# Patient Record
Sex: FEMALE | Race: WHITE | Employment: FULL TIME | URBAN - METROPOLITAN AREA
[De-identification: names, ages, dates, MRNs, and addresses within clinical notes are randomized per-mention and may not be internally consistent; named-entity substitution may affect disease eponyms.]

---

## 2017-06-28 ENCOUNTER — HOSPITAL ENCOUNTER (OUTPATIENT)
Dept: PREADMISSION TESTING | Age: 57
Discharge: HOME | End: 2017-06-28
Payer: COMMERCIAL

## 2017-06-28 VITALS
DIASTOLIC BLOOD PRESSURE: 80 MMHG | WEIGHT: 210 LBS | SYSTOLIC BLOOD PRESSURE: 124 MMHG | TEMPERATURE: 98.1 F | HEART RATE: 80 BPM | RESPIRATION RATE: 18 BRPM | BODY MASS INDEX: 38.64 KG/M2 | HEIGHT: 62 IN

## 2017-06-28 LAB
ANION GAP BLD CALC-SCNC: 9 MMOL/L (ref 5–15)
BASOPHILS # BLD AUTO: 0 K/UL (ref 0–0.1)
BASOPHILS # BLD: 0 % (ref 0–1)
BUN SERPL-MCNC: 12 MG/DL (ref 6–20)
BUN/CREAT SERPL: 14 (ref 12–20)
CALCIUM SERPL-MCNC: 8.8 MG/DL (ref 8.5–10.1)
CHLORIDE SERPL-SCNC: 106 MMOL/L (ref 97–108)
CO2 SERPL-SCNC: 25 MMOL/L (ref 21–32)
CREAT SERPL-MCNC: 0.86 MG/DL (ref 0.55–1.02)
EOSINOPHIL # BLD: 0.2 K/UL (ref 0–0.4)
EOSINOPHIL NFR BLD: 4 % (ref 0–7)
ERYTHROCYTE [DISTWIDTH] IN BLOOD BY AUTOMATED COUNT: 13.5 % (ref 11.5–14.5)
GLUCOSE SERPL-MCNC: 90 MG/DL (ref 65–100)
HCT VFR BLD AUTO: 38.9 % (ref 35–47)
HGB BLD-MCNC: 13 G/DL (ref 11.5–16)
LYMPHOCYTES # BLD AUTO: 25 % (ref 12–49)
LYMPHOCYTES # BLD: 1.6 K/UL (ref 0.8–3.5)
MCH RBC QN AUTO: 29.6 PG (ref 26–34)
MCHC RBC AUTO-ENTMCNC: 33.4 G/DL (ref 30–36.5)
MCV RBC AUTO: 88.6 FL (ref 80–99)
MONOCYTES # BLD: 0.4 K/UL (ref 0–1)
MONOCYTES NFR BLD AUTO: 6 % (ref 5–13)
NEUTS SEG # BLD: 4.2 K/UL (ref 1.8–8)
NEUTS SEG NFR BLD AUTO: 65 % (ref 32–75)
PLATELET # BLD AUTO: 238 K/UL (ref 150–400)
POTASSIUM SERPL-SCNC: 4.1 MMOL/L (ref 3.5–5.1)
RBC # BLD AUTO: 4.39 M/UL (ref 3.8–5.2)
SODIUM SERPL-SCNC: 140 MMOL/L (ref 136–145)
WBC # BLD AUTO: 6.3 K/UL (ref 3.6–11)

## 2017-06-28 PROCEDURE — 80048 BASIC METABOLIC PNL TOTAL CA: CPT | Performed by: PLASTIC SURGERY

## 2017-06-28 PROCEDURE — 36415 COLL VENOUS BLD VENIPUNCTURE: CPT | Performed by: PLASTIC SURGERY

## 2017-06-28 PROCEDURE — 85025 COMPLETE CBC W/AUTO DIFF WBC: CPT | Performed by: PLASTIC SURGERY

## 2017-06-28 RX ORDER — DIPHENHYDRAMINE HCL 25 MG
25 TABLET ORAL
COMMUNITY

## 2017-06-28 RX ORDER — ZOLPIDEM TARTRATE 10 MG/1
10 TABLET ORAL
COMMUNITY

## 2017-06-28 RX ORDER — LEVOTHYROXINE SODIUM 125 UG/1
100 TABLET ORAL
COMMUNITY

## 2017-07-07 ENCOUNTER — ANESTHESIA EVENT (OUTPATIENT)
Dept: MEDSURG UNIT | Age: 57
End: 2017-07-07
Payer: COMMERCIAL

## 2017-07-10 ENCOUNTER — ANESTHESIA (OUTPATIENT)
Dept: MEDSURG UNIT | Age: 57
End: 2017-07-10
Payer: COMMERCIAL

## 2017-07-10 ENCOUNTER — HOSPITAL ENCOUNTER (OUTPATIENT)
Age: 57
Setting detail: OUTPATIENT SURGERY
Discharge: HOME | End: 2017-07-10
Attending: PLASTIC SURGERY | Admitting: PLASTIC SURGERY
Payer: COMMERCIAL

## 2017-07-10 VITALS
DIASTOLIC BLOOD PRESSURE: 65 MMHG | BODY MASS INDEX: 38.62 KG/M2 | RESPIRATION RATE: 21 BRPM | HEIGHT: 62 IN | SYSTOLIC BLOOD PRESSURE: 126 MMHG | TEMPERATURE: 97.8 F | WEIGHT: 209.88 LBS | OXYGEN SATURATION: 96 % | HEART RATE: 105 BPM

## 2017-07-10 PROCEDURE — 74011000250 HC RX REV CODE- 250

## 2017-07-10 PROCEDURE — 77030032490 HC SLV COMPR SCD KNE COVD -B: Performed by: PLASTIC SURGERY

## 2017-07-10 PROCEDURE — 77030011266 HC ELECTRD BLD INSL COVD -A: Performed by: PLASTIC SURGERY

## 2017-07-10 PROCEDURE — 74011250636 HC RX REV CODE- 250/636: Performed by: ANESTHESIOLOGY

## 2017-07-10 PROCEDURE — 76210000032 HC AMBSU PH I REC 3 TO 3.5 HR: Performed by: PLASTIC SURGERY

## 2017-07-10 PROCEDURE — 77030002966 HC SUT PDS J&J -A: Performed by: PLASTIC SURGERY

## 2017-07-10 PROCEDURE — 77030013567 HC DRN WND RESERV BARD -A: Performed by: PLASTIC SURGERY

## 2017-07-10 PROCEDURE — 77030020782 HC GWN BAIR PAWS FLX 3M -B

## 2017-07-10 PROCEDURE — 76030000008 HC AMB SURG OR TIME 4 TO 4.5: Performed by: PLASTIC SURGERY

## 2017-07-10 PROCEDURE — 77030026227 HC FUNL KELLR 2 PCH ALGN -C: Performed by: PLASTIC SURGERY

## 2017-07-10 PROCEDURE — 74011250636 HC RX REV CODE- 250/636

## 2017-07-10 PROCEDURE — 88305 TISSUE EXAM BY PATHOLOGIST: CPT | Performed by: PLASTIC SURGERY

## 2017-07-10 PROCEDURE — 77030018836 HC SOL IRR NACL ICUM -A: Performed by: PLASTIC SURGERY

## 2017-07-10 PROCEDURE — 77030002933 HC SUT MCRYL J&J -A: Performed by: PLASTIC SURGERY

## 2017-07-10 PROCEDURE — 77030013674 HC FIL EXPND TISS ALGN -A: Performed by: PLASTIC SURGERY

## 2017-07-10 PROCEDURE — 77030011267 HC ELECTRD BLD COVD -A: Performed by: PLASTIC SURGERY

## 2017-07-10 PROCEDURE — 77030012407 HC DRN WND BARD -B: Performed by: PLASTIC SURGERY

## 2017-07-10 PROCEDURE — 74011000258 HC RX REV CODE- 258: Performed by: PLASTIC SURGERY

## 2017-07-10 PROCEDURE — 74011000272 HC RX REV CODE- 272: Performed by: PLASTIC SURGERY

## 2017-07-10 PROCEDURE — 74011000250 HC RX REV CODE- 250: Performed by: PLASTIC SURGERY

## 2017-07-10 PROCEDURE — 74011250637 HC RX REV CODE- 250/637: Performed by: PLASTIC SURGERY

## 2017-07-10 PROCEDURE — 77030008684 HC TU ET CUF COVD -B: Performed by: ANESTHESIOLOGY

## 2017-07-10 PROCEDURE — 77030019702 HC WRP THER MENM -C: Performed by: PLASTIC SURGERY

## 2017-07-10 PROCEDURE — 76060000068 HC AMB SURG ANES 4 TO 4.5 HR: Performed by: PLASTIC SURGERY

## 2017-07-10 PROCEDURE — 77030026438 HC STYL ET INTUB CARD -A: Performed by: ANESTHESIOLOGY

## 2017-07-10 PROCEDURE — 77030011640 HC PAD GRND REM COVD -A: Performed by: PLASTIC SURGERY

## 2017-07-10 RX ORDER — NEOSTIGMINE METHYLSULFATE 1 MG/ML
INJECTION INTRAVENOUS AS NEEDED
Status: DISCONTINUED | OUTPATIENT
Start: 2017-07-10 | End: 2017-07-10 | Stop reason: HOSPADM

## 2017-07-10 RX ORDER — CEPHALEXIN 250 MG/1
500 CAPSULE ORAL 3 TIMES DAILY
Qty: 42 CAP | Refills: 0 | Status: SHIPPED | OUTPATIENT
Start: 2017-07-10 | End: 2017-07-17

## 2017-07-10 RX ORDER — SODIUM CHLORIDE, SODIUM LACTATE, POTASSIUM CHLORIDE, CALCIUM CHLORIDE 600; 310; 30; 20 MG/100ML; MG/100ML; MG/100ML; MG/100ML
INJECTION, SOLUTION INTRAVENOUS
Status: DISCONTINUED | OUTPATIENT
Start: 2017-07-10 | End: 2017-07-10 | Stop reason: HOSPADM

## 2017-07-10 RX ORDER — CEFAZOLIN SODIUM IN 0.9 % NACL 2 G/100 ML
PLASTIC BAG, INJECTION (ML) INTRAVENOUS AS NEEDED
Status: DISCONTINUED | OUTPATIENT
Start: 2017-07-10 | End: 2017-07-10 | Stop reason: HOSPADM

## 2017-07-10 RX ORDER — ONDANSETRON 2 MG/ML
INJECTION INTRAMUSCULAR; INTRAVENOUS AS NEEDED
Status: DISCONTINUED | OUTPATIENT
Start: 2017-07-10 | End: 2017-07-10 | Stop reason: HOSPADM

## 2017-07-10 RX ORDER — MIDAZOLAM HYDROCHLORIDE 1 MG/ML
INJECTION, SOLUTION INTRAMUSCULAR; INTRAVENOUS AS NEEDED
Status: DISCONTINUED | OUTPATIENT
Start: 2017-07-10 | End: 2017-07-10 | Stop reason: HOSPADM

## 2017-07-10 RX ORDER — HYDROMORPHONE HYDROCHLORIDE 2 MG/ML
INJECTION, SOLUTION INTRAMUSCULAR; INTRAVENOUS; SUBCUTANEOUS AS NEEDED
Status: DISCONTINUED | OUTPATIENT
Start: 2017-07-10 | End: 2017-07-10 | Stop reason: HOSPADM

## 2017-07-10 RX ORDER — SODIUM CHLORIDE 0.9 % (FLUSH) 0.9 %
5-10 SYRINGE (ML) INJECTION AS NEEDED
Status: DISCONTINUED | OUTPATIENT
Start: 2017-07-10 | End: 2017-07-10 | Stop reason: HOSPADM

## 2017-07-10 RX ORDER — HYDROMORPHONE HYDROCHLORIDE 1 MG/ML
0.2 INJECTION, SOLUTION INTRAMUSCULAR; INTRAVENOUS; SUBCUTANEOUS
Status: DISCONTINUED | OUTPATIENT
Start: 2017-07-10 | End: 2017-07-10 | Stop reason: HOSPADM

## 2017-07-10 RX ORDER — LIDOCAINE HYDROCHLORIDE 10 MG/ML
0.1 INJECTION, SOLUTION EPIDURAL; INFILTRATION; INTRACAUDAL; PERINEURAL AS NEEDED
Status: DISCONTINUED | OUTPATIENT
Start: 2017-07-10 | End: 2017-07-10 | Stop reason: HOSPADM

## 2017-07-10 RX ORDER — MORPHINE SULFATE 10 MG/ML
2 INJECTION, SOLUTION INTRAMUSCULAR; INTRAVENOUS
Status: DISCONTINUED | OUTPATIENT
Start: 2017-07-10 | End: 2017-07-10 | Stop reason: HOSPADM

## 2017-07-10 RX ORDER — BACITRACIN ZINC 500 UNIT/G
OINTMENT (GRAM) TOPICAL AS NEEDED
Status: DISCONTINUED | OUTPATIENT
Start: 2017-07-10 | End: 2017-07-10 | Stop reason: HOSPADM

## 2017-07-10 RX ORDER — SODIUM CHLORIDE 0.9 % (FLUSH) 0.9 %
5-10 SYRINGE (ML) INJECTION EVERY 8 HOURS
Status: DISCONTINUED | OUTPATIENT
Start: 2017-07-10 | End: 2017-07-10 | Stop reason: HOSPADM

## 2017-07-10 RX ORDER — FENTANYL CITRATE 50 UG/ML
25 INJECTION, SOLUTION INTRAMUSCULAR; INTRAVENOUS
Status: DISCONTINUED | OUTPATIENT
Start: 2017-07-10 | End: 2017-07-10 | Stop reason: HOSPADM

## 2017-07-10 RX ORDER — PROPOFOL 10 MG/ML
INJECTION, EMULSION INTRAVENOUS AS NEEDED
Status: DISCONTINUED | OUTPATIENT
Start: 2017-07-10 | End: 2017-07-10 | Stop reason: HOSPADM

## 2017-07-10 RX ORDER — FENTANYL CITRATE 50 UG/ML
INJECTION, SOLUTION INTRAMUSCULAR; INTRAVENOUS AS NEEDED
Status: DISCONTINUED | OUTPATIENT
Start: 2017-07-10 | End: 2017-07-10 | Stop reason: HOSPADM

## 2017-07-10 RX ORDER — DEXAMETHASONE SODIUM PHOSPHATE 4 MG/ML
INJECTION, SOLUTION INTRA-ARTICULAR; INTRALESIONAL; INTRAMUSCULAR; INTRAVENOUS; SOFT TISSUE AS NEEDED
Status: DISCONTINUED | OUTPATIENT
Start: 2017-07-10 | End: 2017-07-10 | Stop reason: HOSPADM

## 2017-07-10 RX ORDER — SUCCINYLCHOLINE CHLORIDE 20 MG/ML
INJECTION INTRAMUSCULAR; INTRAVENOUS AS NEEDED
Status: DISCONTINUED | OUTPATIENT
Start: 2017-07-10 | End: 2017-07-10 | Stop reason: HOSPADM

## 2017-07-10 RX ORDER — OXYCODONE AND ACETAMINOPHEN 5; 325 MG/1; MG/1
1-2 TABLET ORAL
Qty: 40 TAB | Refills: 0 | Status: ON HOLD | OUTPATIENT
Start: 2017-07-10 | End: 2017-09-05

## 2017-07-10 RX ORDER — ROCURONIUM BROMIDE 10 MG/ML
INJECTION, SOLUTION INTRAVENOUS AS NEEDED
Status: DISCONTINUED | OUTPATIENT
Start: 2017-07-10 | End: 2017-07-10 | Stop reason: HOSPADM

## 2017-07-10 RX ORDER — SODIUM CHLORIDE, SODIUM LACTATE, POTASSIUM CHLORIDE, CALCIUM CHLORIDE 600; 310; 30; 20 MG/100ML; MG/100ML; MG/100ML; MG/100ML
50 INJECTION, SOLUTION INTRAVENOUS CONTINUOUS
Status: DISCONTINUED | OUTPATIENT
Start: 2017-07-10 | End: 2017-07-10 | Stop reason: HOSPADM

## 2017-07-10 RX ORDER — SCOLOPAMINE TRANSDERMAL SYSTEM 1 MG/1
1.5 PATCH, EXTENDED RELEASE TRANSDERMAL ONCE
Status: DISCONTINUED | OUTPATIENT
Start: 2017-07-10 | End: 2017-07-10 | Stop reason: HOSPADM

## 2017-07-10 RX ORDER — ONDANSETRON 2 MG/ML
4 INJECTION INTRAMUSCULAR; INTRAVENOUS AS NEEDED
Status: DISCONTINUED | OUTPATIENT
Start: 2017-07-10 | End: 2017-07-10 | Stop reason: HOSPADM

## 2017-07-10 RX ORDER — GLYCOPYRROLATE 0.2 MG/ML
INJECTION INTRAMUSCULAR; INTRAVENOUS AS NEEDED
Status: DISCONTINUED | OUTPATIENT
Start: 2017-07-10 | End: 2017-07-10 | Stop reason: HOSPADM

## 2017-07-10 RX ORDER — LIDOCAINE HYDROCHLORIDE 20 MG/ML
INJECTION, SOLUTION EPIDURAL; INFILTRATION; INTRACAUDAL; PERINEURAL AS NEEDED
Status: DISCONTINUED | OUTPATIENT
Start: 2017-07-10 | End: 2017-07-10 | Stop reason: HOSPADM

## 2017-07-10 RX ADMIN — ROCURONIUM BROMIDE 10 MG: 10 INJECTION, SOLUTION INTRAVENOUS at 12:21

## 2017-07-10 RX ADMIN — NEOSTIGMINE METHYLSULFATE 3 MG: 1 INJECTION INTRAVENOUS at 14:09

## 2017-07-10 RX ADMIN — FENTANYL CITRATE 25 MCG: 50 INJECTION, SOLUTION INTRAMUSCULAR; INTRAVENOUS at 12:01

## 2017-07-10 RX ADMIN — Medication 2 G: at 10:45

## 2017-07-10 RX ADMIN — HYDROMORPHONE HYDROCHLORIDE 1 MG: 2 INJECTION, SOLUTION INTRAMUSCULAR; INTRAVENOUS; SUBCUTANEOUS at 11:39

## 2017-07-10 RX ADMIN — SODIUM CHLORIDE, SODIUM LACTATE, POTASSIUM CHLORIDE, CALCIUM CHLORIDE: 600; 310; 30; 20 INJECTION, SOLUTION INTRAVENOUS at 14:09

## 2017-07-10 RX ADMIN — SODIUM CHLORIDE, SODIUM LACTATE, POTASSIUM CHLORIDE, CALCIUM CHLORIDE: 600; 310; 30; 20 INJECTION, SOLUTION INTRAVENOUS at 10:35

## 2017-07-10 RX ADMIN — ONDANSETRON 4 MG: 2 INJECTION INTRAMUSCULAR; INTRAVENOUS at 12:50

## 2017-07-10 RX ADMIN — PROPOFOL 200 MG: 10 INJECTION, EMULSION INTRAVENOUS at 10:41

## 2017-07-10 RX ADMIN — SUCCINYLCHOLINE CHLORIDE 140 MG: 20 INJECTION INTRAMUSCULAR; INTRAVENOUS at 10:41

## 2017-07-10 RX ADMIN — LIDOCAINE HYDROCHLORIDE 100 MG: 20 INJECTION, SOLUTION EPIDURAL; INFILTRATION; INTRACAUDAL; PERINEURAL at 10:41

## 2017-07-10 RX ADMIN — SODIUM CHLORIDE, SODIUM LACTATE, POTASSIUM CHLORIDE, AND CALCIUM CHLORIDE 50 ML/HR: 600; 310; 30; 20 INJECTION, SOLUTION INTRAVENOUS at 10:00

## 2017-07-10 RX ADMIN — FENTANYL CITRATE 150 MCG: 50 INJECTION, SOLUTION INTRAMUSCULAR; INTRAVENOUS at 10:41

## 2017-07-10 RX ADMIN — ROCURONIUM BROMIDE 35 MG: 10 INJECTION, SOLUTION INTRAVENOUS at 10:55

## 2017-07-10 RX ADMIN — ROCURONIUM BROMIDE 5 MG: 10 INJECTION, SOLUTION INTRAVENOUS at 10:41

## 2017-07-10 RX ADMIN — DEXAMETHASONE SODIUM PHOSPHATE 8 MG: 4 INJECTION, SOLUTION INTRA-ARTICULAR; INTRALESIONAL; INTRAMUSCULAR; INTRAVENOUS; SOFT TISSUE at 10:57

## 2017-07-10 RX ADMIN — MIDAZOLAM HYDROCHLORIDE 2 MG: 1 INJECTION, SOLUTION INTRAMUSCULAR; INTRAVENOUS at 10:35

## 2017-07-10 RX ADMIN — GLYCOPYRROLATE 0.5 MG: 0.2 INJECTION INTRAMUSCULAR; INTRAVENOUS at 14:09

## 2017-07-10 NOTE — ANESTHESIA PREPROCEDURE EVALUATION
Anesthetic History     Increased risk of difficult airway and PONV          Review of Systems / Medical History  Patient summary reviewed, nursing notes reviewed and pertinent labs reviewed    Pulmonary  Within defined limits                 Neuro/Psych   Within defined limits           Cardiovascular                  Exercise tolerance: >4 METS     GI/Hepatic/Renal                Endo/Other      Hypothyroidism  Arthritis and cancer    Comments:  Hx breast cancer Other Findings            Physical Exam    Airway  Mallampati: III  TM Distance: > 6 cm  Neck ROM: normal range of motion   Mouth opening: Normal    Comments: Reduced opening Cardiovascular    Rhythm: regular  Rate: normal         Dental  No notable dental hx       Pulmonary  Breath sounds clear to auscultation               Abdominal  Abdominal exam normal       Other Findings            Anesthetic Plan    ASA: 3  Anesthesia type: general          Induction: Intravenous  Anesthetic plan and risks discussed with: Patient

## 2017-07-10 NOTE — BRIEF OP NOTE
BRIEF OPERATIVE NOTE    Date of Procedure: 7/10/2017   Preoperative Diagnosis: LEFT BREAST CANCER, BREAST ASSYMETRY  Postoperative Diagnosis: LEFT BREAST CANCER, BREAST ASSYMETRY    Procedure(s):  RIGHT BREAST REDUCTION MAMMOPLASTY SYMMETRY; REVISION LEFT BREAST RECONSTRUCTION   Surgeon(s) and Role:     * Aleksandr Holden MD - Primary         Assistant Staff:       Surgical Staff:  Circ-1: Arjun Breen RN  Circ-Relief: Bell Otoole RN; Brooklyn Shepadr RN  Registered Nurse Assistant: Sreedhar Garcia RN; Eli Terry RN  Scrub RN-1: Bell Otoole RN  Scrub RN-Relief: Elissa Del Toro RN  Event Time In   Incision Start 1117   Incision Close      Anesthesia: General   Estimated Blood Loss: 50 cc  Specimens:   ID Type Source Tests Collected by Time Destination   1 : RIGHT BREAST TISSUE Fresh Breast  Aleksandr Holden MD 7/10/2017 1254 Pathology   2 : LEFT BREAST TISSUE Fresh Breast  Aleksandr Holden MD 7/10/2017 1254 Pathology      Findings: none    Complications: none    Implants: * No implants in log *

## 2017-07-10 NOTE — IP AVS SNAPSHOT
[address] 
[phone] Patient: Namrata Anderson MRN: [id number] TRO:8[id number] You are allergic to the following No active allergies Recent Documentation Height Weight BMI OB Status Smoking Status 1.575 m 95.2 kg 38.39 kg/m2 Postmenopausal Never Smoker Emergency Contacts Name Discharge Info Relation Home Work Mobile Deven Schulte DISCHARGE CAREGIVER [3] Spouse [3] [phone] About your hospitalization You were admitted on:  July 10, 2017 You last received care in the:  [facility] PAC[facility] You were discharged on:  July 10, 2017 Unit phone number:  [phone] Why you were hospitalized Your primary diagnosis was:  Not on File Providers Seen During Your Hospitalizations Provider Role Specialty Primary office phone Jazlyn Elias MD Attending Provider Plastic Surgery [phone] Your Primary Care Physician (PCP) Primary Care Physician Office Phone Office Fax Kennedy Lasara [phone] Follow-up Information Follow up With Details Comments Contact Info Lyle Buitrago MD   [address] [facility] [address] [facility] 
[phone] Jazlyn Elias MD Schedule an appointment as soon as possible for a visit in 2 days Please call [phone] to schedule [address] [facility] [address] [place] [address] 
[phone] Current Discharge Medication List  
  
START taking these medications Dose & Instructions Dispensing Information Comments Morning Noon Evening Bedtime  
 cephALEXin 250 mg capsule Commonly known as:  Yolande Pisano Your last dose was: Your next dose is:    
   
   
 Dose:  500 mg Take 2 Caps by mouth three (3) times daily for 7 days. Quantity:  42 Cap Refills:  0 oxyCODONE-acetaminophen 5-325 mg per tablet Commonly known as:  PERCOCET Your last dose was: Your next dose is:    
   
   
 Dose:  1-2 Tab Take 1-2 Tabs by mouth every four (4) hours as needed for Pain. Max Daily Amount: 12 Tabs. Quantity:  40 Tab Refills:  0 CONTINUE these medications which have NOT CHANGED Dose & Instructions Dispensing Information Comments Morning Noon Evening Bedtime AMBIEN 10 mg tablet Generic drug:  zolpidem Your last dose was: Your next dose is:    
   
   
 Dose:  10 mg Take 10 mg by mouth nightly as needed for Sleep. Refills:  0  
     
   
   
   
  
 BENADRYL ALLERGY 25 mg tablet Generic drug:  diphenhydrAMINE Your last dose was: Your next dose is:    
   
   
 Dose:  25 mg Take 25 mg by mouth nightly. Refills:  0  
     
   
   
   
  
 SYNTHROID 125 mcg tablet Generic drug:  levothyroxine Your last dose was: Your next dose is:    
   
   
 Dose:  125 mcg Take 125 mcg by mouth nightly. Refills:  0 Where to Get Your Medications Information on where to get these meds will be given to you by the nurse or doctor. ! Ask your nurse or doctor about these medications  
  cephALEXin 250 mg capsule  
 oxyCODONE-acetaminophen 5-325 mg per tablet Discharge Instructions DANIEL Kamara, Bj Hogan MD, [facility], [address] [place] [address] 
 
breast reduction post-operative instructions 1. Surgical Bra:  You will be in a surgical bra following the procedure, with bandages covering the skin glue on the incisions. You may remove the bandages 24 hours after your surgery. The fitted post-operative bra should be worn at all times except when showering for the first two weeks.   There may be a small amount of oozing from the incisions for the first several days. This is normal.  The bra should be washed when it gets soiled. 2. Drains:  If surgical drains are placed during the operation, they are usually removed the next day. If it is necessary to go home with the drains, you will receive a separate set of instructions for care of the drains. 3. Support bra:  After the first two weeks, you may wear a sports-type bra that fastens in the front and has NO UNDERWIRE. This should be worn day and night, except when showering, for one month after your date of surgery. 4. Activity:  Take it easy for the first several days. No cleaning, housework, or strenuous activity. Do not lift anything over 10 pounds, including children. You may resume non-vigorous activities at two weeks, then normal activities at four weeks. 5. Positioning:  Do not lie on your belly for two weeks. It is alright to lie on your side while sleeping. 6. Bathing: You may shower one day after the surgery. No tub baths or swimming for one week. Remove any gauze before showering. Skin glue will cover your incision. This can get wet in the shower, just letting the water run over it. Then pat everything dry. The skin glue usually falls off on its own, but you may remove it gently if it is still present after one week. Medication:  You will receive prescriptions for an antibiotic and pain medication. Take the antibiotic until it is finished, and the pain medication as needed according to the directions. Avoid aspirin and non-steroidal anti-inflammatories (e.g. ibuprofen) for two weeks after surgery. Do not take the sleep medication with the pain medication. 7. Swelling: If you experience excessive swelling on one side, out of proportion to the other side, this could be due to bleeding under the skin (hematoma). Please call the doctor immediately should this occur.  
 
8. Numbness:  Numbness or unusual sensations of the breasts are to be expected. It can take several weeks to months for this to resolve. Occasionally there may be persistent numbness. If your breasts become increasingly painful or tender, please call the office, [phone]. 9. Follow-up appointment: please call the office at [phone] during regular business hours to schedule an appointment in 2 days. CHANELLE Drain Instructions Purpose: You have had surgery during which a Jorge-Pena drain, or CHANELLE drain, has been placed by your surgeon. A CHANELLE drain is a rubber tube which goes under the skin and drains excess fluid during the healing process so that this fluid does not accumulate. There is a one-way valve which allows the fluid to collect in the bulb on the end of the drain. The drain is usually held in place by a single stitch. The color of the drainage can range from reddish to pink to straw-colored. Care of the drain:  Caring for the CHANELLE drain is fairly easy. First, protect the drain so that it does not get pulled inadvertently. You may fasten them to your clothing with a safety pin through the floppy tag on the bulb. DO NOT place a pin through either the drain tubing or the bulb itself. The drain works by maintaining a constant low pressure of suction when the bulb is in the collapsed (squeezed in) position. If the bulb will not maintain this collapsed position when it is recapped, please call the office, as the drain may not be working properly. If you had an On-Q® pain pump placed during your surgery, you may wish to keep the black param pack once the On-Q® has been removed to carry the CHANELLE drain bulbs. Measuring the drainage:  Grasp the bulb in one hand and remove the cap with the other hand. This will cause the bulb to relax into a round shape. Holding the open end over a specimen cup, squirt the fluid into the cup by squeezing the bulb.   Once the bulb is empty, squeeze it in (collapse it) with one hand, and replace the cap with your other hand. Then holding the measuring cup level, note how much fluid there is (in milliliters, mL), and record this number on the chart below. Discard the fluid in the toilet and rinse out the specimen cup. Note: your specimen cup may be in cubic centimeters (cc). 1 cc = 1 mL. Removal:  The CHANELLE drain will be removed in the office when the daily drainage is at a low enough level. You may be asked to call the office with your measuring totals to determine if the drain(s) is (are) ready to be removed. Removal involves minimal discomfort without the need for anesthesia. The drain site in the skin heals on its own once the drain is removed without any further stitches. Showering: Your surgeon may give you permission to shower while you have one or more CHANELLE drains in. Just let the water run over the drain sites and then pat them dry when you are done. Some patients like to place a long string necklace around their necks during showers to which they can safety pin the tag on the bulb to prevent it from dangling. DO NOT take a tub bath, go swimming (pool or lake/ocean), or otherwise submerge your body in water before all CHANELLE drains have been removed and you are permitted by your surgeon. Things to look out for:  Please call the office immediately ([phone]) if you notice: 
? Sudden bright or dark red bleeding into the bulb or around the drain site in the skin ? Dislodgment of the CHANLELE drain or if the drain falls out ? Spreading redness around the drain site in the skin ? Cloudy or foul-smelling drainage in the bulb or around the drain site ? Fever, shaking chills, excessive swelling, pain or discomfort ? Any other concerns or questions Date #1 (AM) #1 (PM) Daily Total #1 
(AM+PM) #2 (AM) #2 (PM) Daily Total 
#2 
(AM+PM) #3 (AM) #3 (PM) Daily Total 
#3 
(AM+PM) #4 (AM) #4 (PM) Daily Total #4 
(AM+PM) DISCHARGE SUMMARY from Nurse The following personal items are in your possession at time of discharge: 
 
Dental Appliances: None Clothing:  (clothes in bag) PATIENT INSTRUCTIONS: 
 
 
F-face looks uneven A-arms unable to move or move unevenly S-speech slurred or non-existent T-time-call 911 as soon as signs and symptoms begin-DO NOT go Back to bed or wait to see if you get better-TIME IS BRAIN. Warning Signs of HEART ATTACK Call 911 if you have these symptoms: 
? Chest discomfort. Most heart attacks involve discomfort in the center of the chest that lasts more than a few minutes, or that goes away and comes back. It can feel like uncomfortable pressure, squeezing, fullness, or pain. ? Discomfort in other areas of the upper body. Symptoms can include pain or discomfort in one or both arms, the back, neck, jaw, or stomach. ? Shortness of breath with or without chest discomfort. ? Other signs may include breaking out in a cold sweat, nausea, or lightheadedness. Don't wait more than five minutes to call [address]! Fast action can save your life. Calling 911 is almost always the fastest way to get lifesaving treatment. Emergency Medical Services staff can begin treatment when they arrive  up to an hour sooner than if someone gets to the hospital by car. The discharge information has been reviewed with the patient and spouse. The patient and spouse verbalized understanding. Discharge medications reviewed with the patient and spouse and appropriate educational materials and side effects teaching were provided. Discharge Orders None Introducing [facility]! Maris Au introduces e-Tag patient portal. Now you can access parts of your medical record, email your doctor's office, and request medication refills online. 1. In your internet browser, go to [email or web address] 2. Click on the First Time User? Click Here link in the Sign In box. You will see the New Member Sign Up page. 3. Enter your e-Tag Access Code exactly as it appears below. You will not need to use this code after youve completed the sign-up process. If you do not sign up before the expiration date, you must request a new code. · e-Tag Access Code: [email or web address] Expires: 9/26/2017 10:25 AM 
 
4. Enter the last four digits of your Social Security Number (xxxx) and Date of Birth (mm/dd/yyyy) as indicated and click Submit. You will be taken to the next sign-up page. 5. Create a e-Tag ID. This will be your e-Tag login ID and cannot be changed, so think of one that is secure and easy to remember. 6. Create a e-Tag password. You can change your password at any time. 7. Enter your Password Reset Question and Answer. This can be used at a later time if you forget your password. 8. Enter your e-mail address. You will receive e-mail notification when new information is available in [address] 9. Click Sign Up. You can now view and download portions of your medical record. 10. Click the Download Summary menu link to download a portable copy of your medical information. If you have questions, please visit the Frequently Asked Questions section of the e-Tag website. Remember, e-Tag is NOT to be used for urgent needs. For medical emergencies, dial 911. Now available from your iPhone and Android! General Information Please provide this summary of care documentation to your next provider.  
  
  
    
    
 Patient Signature: ____________________________________________________________ Date:  ____________________________________________________________  
  
[facility] Provider Signature:  ____________________________________________________________ Date:  ____________________________________________________________

## 2017-07-10 NOTE — ANESTHESIA POSTPROCEDURE EVALUATION
Post-Anesthesia Evaluation and Assessment    Patient: Denisse Jonas MRN: [id number]  SSN: [id number]    YOB: 1960  Age: 61 y.o. Sex: female       Cardiovascular Function/Vital Signs  Visit Vitals    /68    Pulse 100    Temp 36.6 °C (97.8 °F)    Resp 15    Ht 5' 2\" (1.575 m)    Wt 95.2 kg (209 lb 14.1 oz)    SpO2 98%    BMI 38.39 kg/m2       Patient is status post general anesthesia for Procedure(s):  RIGHT BREAST REDUCTION MAMMOPLASTY SYMMETRY; REVISION LEFT BREAST RECONSTRUCTION . Nausea/Vomiting: None    Postoperative hydration reviewed and adequate. Pain:  Pain Scale 1: Visual (07/10/17 1535)  Pain Intensity 1: 0 (07/10/17 1435)   Managed    Neurological Status:   Neuro (WDL): Within Defined Limits (07/10/17 1535)   At baseline    Mental Status and Level of Consciousness: Arousable    Pulmonary Status:   O2 Device: Nasal cannula (07/10/17 1435)   Adequate oxygenation and airway patent    Complications related to anesthesia: None    Post-anesthesia assessment completed.  No concerns    Signed By: Mariluz Feliciano MD     July 10, 2017

## 2017-07-10 NOTE — ROUTINE PROCESS
Patient: Sharyn Christopher MRN: [id number]  SSN: [id number]   YOB: 1960  Age: 61 y.o. Sex: female     Patient is status post Procedure(s):  RIGHT BREAST REDUCTION MAMMOPLASTY SYMMETRY; REVISION LEFT BREAST RECONSTRUCTION . Surgeon(s) and Role:     * Meenakshi Watkins MD - Primary    Local/Dose/Irrigation:  SEE MAR                  Peripheral IV 07/10/17 Right Hand (Active)   Site Assessment Clean, dry, & intact 7/10/2017 10:07 AM   Phlebitis Assessment 0 7/10/2017 10:07 AM   Infiltration Assessment 0 7/10/2017 10:07 AM   Dressing Status Occlusive 7/10/2017 10:07 AM          Jorge-Pena Drain 07/10/17 Left Breast (Active)       Jorge-Pena Drain 07/10/17 Right Breast (Active)       Jorge-Pena Drain 07/10/17 Left Chest (Active)       Jorge-Pena Drain 07/10/17 Right Chest (Active)      Airway - Endotracheal Tube 07/10/17 Oral (Active)                   Dressing/Packing:  Wound Breast Right-DRESSING TYPE: 4 x 4;ABD pad; Adhesive wound dressing (Band-Aid); Wound gel;Xeroform (07/10/17 1100)  Wound Breast Left-DRESSING TYPE: 4 x 4;ABD pad;Xeroform (07/10/17 1100)  Splint/Cast:  ]    Other:

## 2017-07-10 NOTE — DISCHARGE INSTRUCTIONS
[employer]. MD Joanne, [address] [facility], [address]    breast reduction post-operative instructions      1. Surgical Bra:  You will be in a surgical bra following the procedure, with bandages covering the skin glue on the incisions. You may remove the bandages 24 hours after your surgery. The fitted post-operative bra should be worn at all times except when showering for the first two weeks. There may be a small amount of oozing from the incisions for the first several days. This is normal.  The bra should be washed when it gets soiled. 2. Drains:  If surgical drains are placed during the operation, they are usually removed the next day. If it is necessary to go home with the drains, you will receive a separate set of instructions for care of the drains. 3. Support bra:  After the first two weeks, you may wear a sports-type bra that fastens in the front and has NO UNDERWIRE. This should be worn day and night, except when showering, for one month after your date of surgery. 4. Activity:  Take it easy for the first several days. No cleaning, housework, or strenuous activity. Do not lift anything over 10 pounds, including children. You may resume non-vigorous activities at two weeks, then normal activities at four weeks. 5. Positioning:  Do not lie on your belly for two weeks. It is alright to lie on your side while sleeping. 6. Bathing: You may shower one day after the surgery. No tub baths or swimming for one week. Remove any gauze before showering. Skin glue will cover your incision. This can get wet in the shower, just letting the water run over it. Then pat everything dry. The skin glue usually falls off on its own, but you may remove it gently if it is still present after one week. Medication:  You will receive prescriptions for an antibiotic and pain medication.   Take the antibiotic until it is finished, and the pain medication as needed according to the directions. Avoid aspirin and non-steroidal anti-inflammatories (e.g. ibuprofen) for two weeks after surgery. Do not take the sleep medication with the pain medication. 7. Swelling: If you experience excessive swelling on one side, out of proportion to the other side, this could be due to bleeding under the skin (hematoma). Please call the doctor immediately should this occur. 8. Numbness:  Numbness or unusual sensations of the breasts are to be expected. It can take several weeks to months for this to resolve. Occasionally there may be persistent numbness. If your breasts become increasingly painful or tender, please call the office, [phone].    9. Follow-up appointment: please call the office at [phone] during regular business hours to schedule an appointment in 2 days. CHANELLE Drain Instructions    Purpose: You have had surgery during which a Jorge-Pena drain, or CHANELLE drain, has been placed by your surgeon. A CHANELLE drain is a rubber tube which goes under the skin and drains excess fluid during the healing process so that this fluid does not accumulate. There is a one-way valve which allows the fluid to collect in the bulb on the end of the drain. The drain is usually held in place by a single stitch. The color of the drainage can range from reddish to pink to straw-colored. Care of the drain:  Caring for the CHANELLE drain is fairly easy. First, protect the drain so that it does not get pulled inadvertently. You may fasten them to your clothing with a safety pin through the floppy tag on the bulb. DO NOT place a pin through either the drain tubing or the bulb itself. The drain works by maintaining a constant low pressure of suction when the bulb is in the collapsed (squeezed in) position. If the bulb will not maintain this collapsed position when it is recapped, please call the office, as the drain may not be working properly.   If you had an On-Q® pain pump placed during your surgery, you may wish to keep the black param pack once the On-Q® has been removed to carry the CHANELLE drain bulbs. Measuring the drainage:  Grasp the bulb in one hand and remove the cap with the other hand. This will cause the bulb to relax into a round shape. Holding the open end over a specimen cup, squirt the fluid into the cup by squeezing the bulb. Once the bulb is empty, squeeze it in (collapse it) with one hand, and replace the cap with your other hand. Then holding the measuring cup level, note how much fluid there is (in milliliters, mL), and record this number on the chart below. Discard the fluid in the toilet and rinse out the specimen cup. Note: your specimen cup may be in cubic centimeters (cc). 1 cc = 1 mL. Removal:  The CHANELLE drain will be removed in the office when the daily drainage is at a low enough level. You may be asked to call the office with your measuring totals to determine if the drain(s) is (are) ready to be removed. Removal involves minimal discomfort without the need for anesthesia. The drain site in the skin heals on its own once the drain is removed without any further stitches. Showering: Your surgeon may give you permission to shower while you have one or more CHANELLE drains in. Just let the water run over the drain sites and then pat them dry when you are done. Some patients like to place a long string necklace around their necks during showers to which they can safety pin the tag on the bulb to prevent it from dangling. DO NOT take a tub bath, go swimming (pool or lake/ocean), or otherwise submerge your body in water before all CHANELLE drains have been removed and you are permitted by your surgeon.                 Things to look out for:  Please call the office immediately ([phone]) if you notice:   Sudden bright or dark red bleeding into the bulb or around the drain site in the skin   Dislodgment of the CHANELLE drain or if the drain falls out   Spreading redness around the drain site in the skin   Cloudy or foul-smelling drainage in the bulb or around the drain site   Fever, shaking chills, excessive swelling, pain or discomfort   Any other concerns or questions        Date           #1 (AM)             #1 (PM)             Daily Total #1  (AM+PM)             #2 (AM)           #2 (PM)           Daily  Total  #2  (AM+PM)           #3 (AM)           #3 (PM)           Daily  Total  #3  (AM+PM)           #4 (AM)             #4 (PM)             Daily Total #4  (AM+PM)                   DISCHARGE SUMMARY from Nurse    The following personal items are in your possession at time of discharge:    Dental Appliances: None              Clothing:  (clothes in bag)                PATIENT INSTRUCTIONS:    After general anesthesia or intravenous sedation, for 24 hours or while taking prescription Narcotics:  · Limit your activities  · Do not drive and operate hazardous machinery  · Do not make important personal or business decisions  · Do  not drink alcoholic beverages  · If you have not urinated within 8 hours after discharge, please contact your surgeon on call. Report the following to your surgeon:  · Excessive pain, swelling, redness or odor of or around the surgical area  · Temperature over 100.5  · Nausea and vomiting lasting longer than 4 hours or if unable to take medications  · Any signs of decreased circulation or nerve impairment to extremity: change in color, persistent  numbness, tingling, coldness or increase pain  · Any questions        What to do at Home:  Recommended activity: See surgical instructions. If you experience any of the following symptoms as noted above, please follow up with Dr. Gaby Cruz. *  Please give a list of your current medications to your Primary Care Provider. *  Please update this list whenever your medications are discontinued, doses are      changed, or new medications (including over-the-counter products) are added.     *  Please carry medication information at all times in case of emergency situations. These are general instructions for a healthy lifestyle:    No smoking/ No tobacco products/ Avoid exposure to second hand smoke    Surgeon General's Warning:  Quitting smoking now greatly reduces serious risk to your health. Obesity, smoking, and sedentary lifestyle greatly increases your risk for illness    A healthy diet, regular physical exercise & weight monitoring are important for maintaining a healthy lifestyle    You may be retaining fluid if you have a history of heart failure or if you experience any of the following symptoms:  Weight gain of 3 pounds or more overnight or 5 pounds in a week, increased swelling in our hands or feet or shortness of breath while lying flat in bed. Please call your doctor as soon as you notice any of these symptoms; do not wait until your next office visit. Recognize signs and symptoms of STROKE:    F-face looks uneven    A-arms unable to move or move unevenly    S-speech slurred or non-existent    T-time-call 911 as soon as signs and symptoms begin-DO NOT go       Back to bed or wait to see if you get better-TIME IS BRAIN. Warning Signs of HEART ATTACK     Call 911 if you have these symptoms:   Chest discomfort. Most heart attacks involve discomfort in the center of the chest that lasts more than a few minutes, or that goes away and comes back. It can feel like uncomfortable pressure, squeezing, fullness, or pain.  Discomfort in other areas of the upper body. Symptoms can include pain or discomfort in one or both arms, the back, neck, jaw, or stomach.  Shortness of breath with or without chest discomfort.  Other signs may include breaking out in a cold sweat, nausea, or lightheadedness. Don't wait more than five minutes to call 911 - MINUTES MATTER! Fast action can save your life. Calling 911 is almost always the fastest way to get lifesaving treatment.  Emergency Medical Services staff can begin treatment when they arrive -- up to an hour sooner than if someone gets to the hospital by car. The discharge information has been reviewed with the patient and spouse. The patient and spouse verbalized understanding. Discharge medications reviewed with the patient and spouse and appropriate educational materials and side effects teaching were provided.

## 2017-07-10 NOTE — ROUTINE PROCESS
Patient: Cesar Azul MRN: [id number]  SSN: [id number]   YOB: 1960  Age: 61 y.o. Sex: female     Patient is status post Procedure(s):  RIGHT BREAST REDUCTION MAMMOPLASTY SYMMENTRY; REVISION LEFT BREAST RECONSTRUCTION . Surgeon(s) and Role:     * Last Brar MD - Primary    Local/Dose/Irrigation:SEE MAR                  Peripheral IV 07/10/17 Right Hand (Active)   Site Assessment Clean, dry, & intact 7/10/2017 10:07 AM   Phlebitis Assessment 0 7/10/2017 10:07 AM   Infiltration Assessment 0 7/10/2017 10:07 AM   Dressing Status Occlusive 7/10/2017 10:07 AM            Airway - Endotracheal Tube 07/10/17 Oral (Active)                   Dressing/Packing:  Wound Breast Right-DRESSING TYPE: 4 x 4;ABD pad; Adhesive wound dressing (Band-Aid); Wound gel;Xeroform (07/10/17 1100)  Splint/Cast:  ]    Other:

## 2017-07-12 NOTE — OP NOTES
[address] [place] [facility] [address], [address]   OP NOTE       Name:  Srinivasan Mena   MR#:  [id number]   :  1960   Account #:  [de-identified]    Surgery Date:  07/10/2017   Date of Adm:  07/10/2017       PREOPERATIVE DIAGNOSES   1. Left-sided breast cancer, status post lumpectomy and radiation. 2. Disproportion of reconstructed graft. 3. Macromastia. 4. Asymmetry of the breasts. POSTOPERATIVE DIAGNOS:  SAME    PROCEDURES PERFORMED:      SURGEON: Luther Skaggs MD    ASSISTANT: Norma Iniguez. ESTIMATED BLOOD LOSS: Approximately 50 mL. SPECIMENS REMOVED: Bilateral breast skin and tissue. ANESTHESIA:  General.     COMPLICATIONS: None. DRAINS: #15 round Jorge-Pena drain in each breast for a total of 2   drains. INDICATIONS FOR SURGERY: The patient is a 59-year-old woman   who has a history of left-sided breast cancer. She was treated with a   lumpectomy and radiation and the resultant breast is very small   compared to the right side. She additionally had some deformities with   reconstructed breast and generalized ptosis. The right side is extremely   large and it is causing significant amount of back pain, neck pain and   shoulder pain. She is here for alleviation of her musculoskeletal pain   due to the asymmetry of the size of her breasts. DESCRIPTION OF PROCEDURE: After the patient signed informed   consent, she was marked in the preoperative holding area in the   upright position. She was then taken to the operating room and placed   on the operating room table in the supine position. She was placed   under general endotracheal anesthesia without complication. A time-  out was performed in order to verify the patient's identity and surgical   sites, which were both correct. She was given preoperative antibiotics.    She was injected with local anesthesia, which consisted of 100 mL of   injectable normal saline, 1% lidocaine with epinephrine and 0.5% Marcaine with epinephrine for local anesthesia and hemostasis. This   mixture was injected into each breast for preoperative local anesthesia   and hemostasis. The patient was then prepped and draped using   Betadine paint in sterile surgical fashion. I began on the right side where a 42 mm cookie cutter was utilized in   order to delineate the new nipple areolar complex. A vertical skin   pattern was designed and the superior medial pedicle was designed   around the new nipple areolar complex. The breast was placed under a   breast tourniquet, which consisted of a wet laparotomy pad and a Danielle   clamp. A #10 blade was utilized in order to incise the incision around   the new nipple areolar complex and to de-epithelize the superior   medial pedicle. The tourniquet was then released and the pedicle was   isolated down to the pectoralis major muscle fascia using   electrocautery. The inferior lateral and superior portions of the vertical   technique were incised and removed. The vertical limb was measured   at 6.5 cm and anything inferior to this was excised in the form of a dog   ear using a #10 blade and electro-cautery. I then turned my attention to the left side where a mastopexy was   performed in order to match the contralateral breasts. This was also   designed as a vertical mastopexy skin pattern with a small inclusion of   skin at the inframammary fold to give it horizontal incision at the base. Very little breast tissue was removed from the left side to total   approximately 55 grams. On the right side it was approximately 600   grams. All of the breast tissue was sent to Pathology for permanent   section. Excellent hemostasis was achieved. The patient was   temporarily closed using surgical staples and placed in the upright and   sitting position. The breast volume appeared to be very similar between   the 2 sides and shape was similar as well. The patient was placed in   the supine position. The operative sites were irrigated copiously using   Bacitracin and saline solution. Excellent hemostasis was achieved   using electrocautery. The vertical limb was closed using interrupted 3-0   PDS suture and the deep dermis was closed using interrupted 3-0   Monocryl. The skin was closed using running subcuticular 4-0   Monocryl. The dressings consisted of Bacitracin ointment, Xeroform, 4 x 4s, ABD   and a surgical bra. The nipple areolar complex had good vascularity   and capillary refill at the conclusion of the case. It was apparent that   the left side was more difficult to close because of the effects of   radiation in the past. Overall, there was minimal tension on the   incisions. The patient was then extubated and transferred to the PACU in stable   condition. There were no complications during the procedure. The   patient tolerated the procedure without complications. The instruments,   sponge and needle count was correct at the conclusion of the case.          Johanna Mchugh MD SA / [email or web address]   D:  07/12/2017   08:18   T:  07/12/2017   15:41   Job #:  [id number]

## 2017-09-05 ENCOUNTER — HOSPITAL ENCOUNTER (OUTPATIENT)
Dept: INTERVENTIONAL RADIOLOGY/VASCULAR | Age: 57
Discharge: HOME | End: 2017-09-05
Attending: PLASTIC SURGERY | Admitting: PLASTIC SURGERY

## 2017-09-05 VITALS
DIASTOLIC BLOOD PRESSURE: 91 MMHG | RESPIRATION RATE: 20 BRPM | BODY MASS INDEX: 38.64 KG/M2 | HEART RATE: 79 BPM | OXYGEN SATURATION: 97 % | HEIGHT: 62 IN | SYSTOLIC BLOOD PRESSURE: 135 MMHG | TEMPERATURE: 98.2 F | WEIGHT: 210 LBS

## 2017-09-05 RX ORDER — LIDOCAINE HYDROCHLORIDE 20 MG/ML
20 INJECTION, SOLUTION INFILTRATION; PERINEURAL
Status: DISCONTINUED | OUTPATIENT
Start: 2017-09-05 | End: 2017-09-05 | Stop reason: HOSPADM

## 2017-09-05 NOTE — ROUTINE PROCESS
Pt. Blake Canal by Patel Naylor and MD informed no fluid to put a drain in. Pt. Encouraged to follow up with primary physician office. Discharged ambulatory to parking lot with mother.

## (undated) DEVICE — SKIN MARKER,REGULAR TIP WITH RULER AND LABELS: Brand: DEVON

## (undated) DEVICE — STERILE POLYISOPRENE POWDER-FREE SURGICAL GLOVES WITH EMOLLIENT COATING: Brand: PROTEXIS

## (undated) DEVICE — SURGICAL PROCEDURE PACK BASIN MAJ SET CUST NO CAUT

## (undated) DEVICE — BASIC PACK: Brand: CONVERTORS

## (undated) DEVICE — SYR 50ML LR LCK 1ML GRAD NSAF --

## (undated) DEVICE — KIT TISS EXP W/ 60ML SYR 122CM TRNSF SET PIERCING DEV L25CM

## (undated) DEVICE — SOLUTION IV 1000ML 0.9% SOD CHL

## (undated) DEVICE — SUTURE MCRYL SZ 3-0 L27IN ABSRB UD L19MM PS-2 3/8 CIR PRIM Y427H

## (undated) DEVICE — SENSOR TEMP SKIN DISP

## (undated) DEVICE — (D)SYR 10ML 1/5ML GRAD NSAF -- PKGING CHANGE USE ITEM 338027

## (undated) DEVICE — GRADUATED BOWL: Brand: DEVON

## (undated) DEVICE — SUTURE MCRYL SZ 3-0 L27IN ABSRB UD L24MM PS-1 3/8 CIR PRIM Y936H

## (undated) DEVICE — INSULATED BLADE ELECTRODE;CAUTION: FOR MANUFACTURING, PROCESSING, OR REPACKING.: Brand: EDGE

## (undated) DEVICE — BRA COMPR MAMM SILKY 3XL BGE

## (undated) DEVICE — SUTURE MCRYL SZ 4-0 L27IN ABSRB UD L19MM PS-2 1/2 CIR PRIM Y426H

## (undated) DEVICE — SUTURE PDS II SZ 3-0 L27IN ABSRB VLT L26MM SH 1/2 CIR Z316H

## (undated) DEVICE — INTENDED FOR TISSUE SEPARATION, AND OTHER PROCEDURES THAT REQUIRE A SHARP SURGICAL BLADE TO PUNCTURE OR CUT.: Brand: BARD-PARKER ® CARBON RIB-BACK BLADES

## (undated) DEVICE — Device

## (undated) DEVICE — 1200 GUARD II KIT W/5MM TUBE W/O VAC TUBE: Brand: GUARDIAN

## (undated) DEVICE — DRESSING PETRO GZ XRFRM CURAD ST OVERWRAP 5 X 9 IN

## (undated) DEVICE — SYSTEM IMPL DEL FOR BRST IMPL FUN (SEE COMMENT)

## (undated) DEVICE — MEDI-VAC NON-CONDUCTIVE SUCTION TUBING: Brand: CARDINAL HEALTH

## (undated) DEVICE — TOWEL SURG W17XL27IN STD BLU COT NONFENESTRATED PREWASHED

## (undated) DEVICE — ABDOMINAL PAD: Brand: DERMACEA

## (undated) DEVICE — INFECTION CONTROL KIT SYS

## (undated) DEVICE — STERILE POLYISOPRENE POWDER-FREE SURGICAL GLOVES: Brand: PROTEXIS

## (undated) DEVICE — DRAPE,REIN 53X77,STERILE: Brand: MEDLINE

## (undated) DEVICE — GAUZE SPONGES,12 PLY: Brand: CURITY

## (undated) DEVICE — KENDALL SCD EXPRESS SLEEVES, KNEE LENGTH, MEDIUM: Brand: KENDALL SCD

## (undated) DEVICE — NDL PRT INJ NSAF BLNT 18GX1.5 --

## (undated) DEVICE — INSULATED BLADE ELECTRODE: Brand: EDGE

## (undated) DEVICE — DEVICE TRNSF SPIK STL 2008S] MICROTEK MEDICAL INC]

## (undated) DEVICE — TRAY PREP DRY W/ PREM GLV 2 APPL 6 SPNG 2 UNDPD 1 OVERWRAP

## (undated) DEVICE — REM POLYHESIVE ADULT PATIENT RETURN ELECTRODE: Brand: VALLEYLAB

## (undated) DEVICE — ROCKER SWITCH PENCIL BLADE ELECTRODE, HOLSTER: Brand: EDGE

## (undated) DEVICE — DRAIN SURG 15FR L3/16IN DIA4.7MM SIL CHN RND FULL FLUT TRCR

## (undated) DEVICE — WRAP SURG W1.31XL1.34M CARD FOR PT 165-172CM THERMOWRP

## (undated) DEVICE — DRAPE,CHEST,FENES,15X10,STERIL: Brand: MEDLINE

## (undated) DEVICE — NEEDLE HYPO 22GA L1.5IN BLK S STL HUB POLYPR SHLD REG BVL

## (undated) DEVICE — ASTOUND STANDARD SURGICAL GOWN, XL: Brand: CONVERTORS

## (undated) DEVICE — COLLECTION SET 21GA 1.25IN --

## (undated) DEVICE — SIMPLICITY FLUFF UNDERPAD 23X36, MODERATE: Brand: SIMPLICITY

## (undated) DEVICE — SMOKE EVACUATION PENCIL: Brand: VALLEYLAB

## (undated) DEVICE — SPONGE LAP 18X18IN STRL -- 5/PK

## (undated) DEVICE — OCCLUSIVE GAUZE STRIP,3% BISMUTH TRIBROMOPHENATE IN PETROLATUM BLEND: Brand: XEROFORM

## (undated) DEVICE — Z DISCONTINUED NO SUB IDED GLOVE SURG SZ 6 L12IN FNGR THK13MIL WHT ISOLEX POLYISOPRENE